# Patient Record
Sex: MALE | Race: BLACK OR AFRICAN AMERICAN | NOT HISPANIC OR LATINO | URBAN - METROPOLITAN AREA
[De-identification: names, ages, dates, MRNs, and addresses within clinical notes are randomized per-mention and may not be internally consistent; named-entity substitution may affect disease eponyms.]

---

## 2017-10-19 PROBLEM — 302109006 GASTROSTOMY PRESENT: Status: ACTIVE | Noted: 2017-10-19

## 2017-10-19 PROBLEM — 14760008 CONSTIPATION: Status: ACTIVE | Noted: 2017-10-19

## 2017-10-19 PROBLEM — 16932000 NAUSEA AND VOMITING: Status: ACTIVE | Noted: 2017-10-19

## 2017-10-19 PROBLEM — 275978004 SCREENING FOR MALIGNANT NEOPLASM OF COLON: Status: ACTIVE | Noted: 2017-10-19

## 2020-08-03 ENCOUNTER — TELEPHONE ENCOUNTER (OUTPATIENT)
Dept: URBAN - METROPOLITAN AREA CLINIC 92 | Facility: CLINIC | Age: 70
End: 2020-08-03

## 2020-08-04 ENCOUNTER — TELEPHONE ENCOUNTER (OUTPATIENT)
Dept: URBAN - METROPOLITAN AREA CLINIC 92 | Facility: CLINIC | Age: 70
End: 2020-08-04

## 2020-08-04 ENCOUNTER — OFFICE VISIT (OUTPATIENT)
Dept: URBAN - METROPOLITAN AREA CLINIC 25 | Facility: CLINIC | Age: 70
End: 2020-08-04
Payer: COMMERCIAL

## 2020-08-04 VITALS
HEIGHT: 67 IN | TEMPERATURE: 98 F | RESPIRATION RATE: 16 BRPM | HEART RATE: 75 BPM | DIASTOLIC BLOOD PRESSURE: 76 MMHG | WEIGHT: 130 LBS | BODY MASS INDEX: 20.4 KG/M2 | SYSTOLIC BLOOD PRESSURE: 199 MMHG

## 2020-08-04 DIAGNOSIS — R13.10 DYSPHAGIA: ICD-10-CM

## 2020-08-04 DIAGNOSIS — K94.23 PEG TUBE MALFUNCTION: ICD-10-CM

## 2020-08-04 PROCEDURE — 3017F COLORECTAL CA SCREEN DOC REV: CPT | Performed by: INTERNAL MEDICINE

## 2020-08-04 PROCEDURE — 99213 OFFICE O/P EST LOW 20 MIN: CPT | Performed by: INTERNAL MEDICINE

## 2020-08-04 PROCEDURE — G8427 DOCREV CUR MEDS BY ELIG CLIN: HCPCS | Performed by: INTERNAL MEDICINE

## 2020-08-04 PROCEDURE — G9903 PT SCRN TBCO ID AS NON USER: HCPCS | Performed by: INTERNAL MEDICINE

## 2020-08-04 PROCEDURE — G8417 CALC BMI ABV UP PARAM F/U: HCPCS | Performed by: INTERNAL MEDICINE

## 2020-08-04 RX ORDER — HYDROCODONE BITARTRATE AND ACETAMINOPHEN 5; 325 MG/1; MG/1
TABLET ORAL
Qty: 0 | Refills: 0 | Status: ACTIVE | COMMUNITY
Start: 1900-01-01

## 2020-08-04 RX ORDER — DOCUSATE SODIUM 100 MG/1
CAPSULE, LIQUID FILLED ORAL
Qty: 0 | Refills: 0 | Status: ACTIVE | COMMUNITY
Start: 1900-01-01

## 2020-08-04 RX ORDER — AMLODIPINE BESYLATE 10 MG/1
TAKE 1 TABLET (10 MG) BY ORAL ROUTE ONCE DAILY TABLET ORAL 1
Qty: 0 | Refills: 0 | Status: ACTIVE | COMMUNITY
Start: 1900-01-01

## 2020-08-04 RX ORDER — PANTOPRAZOLE SODIUM 40 MG/1
TAKE 1 TABLET (40 MG) BY ORAL ROUTE 2 TIMES PER DAY TABLET, DELAYED RELEASE ORAL 2
Qty: 0 | Refills: 0 | Status: ACTIVE | COMMUNITY
Start: 1900-01-01

## 2020-08-04 RX ORDER — INSULIN DETEMIR 100 [IU]/ML
10 UNITS INJECTION, SOLUTION SUBCUTANEOUS
Qty: 1 | Refills: 0 | Status: ACTIVE | COMMUNITY
Start: 1900-01-01

## 2020-08-04 RX ORDER — ONDANSETRON 4 MG/1
DISSOLVE  1-2 TABLETS BY ORAL ROUTE EVERY 8 HOURS AS NEEDED TABLET, ORALLY DISINTEGRATING ORAL
Qty: 30 | Refills: 5 | Status: ACTIVE | COMMUNITY
Start: 2019-07-02

## 2020-08-04 NOTE — PHYSICAL EXAM GASTROINTESTINAL
Abdomen , soft, nontender, nondistended , no guarding or rigidity , no masses palpable , normal bowel sounds.  PEG site intact without erythema/induration/discharge/tenderness Liver and Spleen , no hepatomegaly present , no hepatosplenomegaly , liver nontender , spleen not palpable

## 2020-08-04 NOTE — HPI-TODAY'S VISIT:
Patient last seen 2/2020.  Yesterday at HD his PEG fell out.  He went to the ER at [place].  According to the patient the PEG was d/carlita in the ER.  He has been keeping the site covered today.  He ate today.  Overall he has been eating well.  He denies f/c.  He does tolerate a regular diet.  He denies abdominal pain.  He denies regular N/V.  He denies recurrent CGE.  HE denies early satiety.  He denies LGI symptoms.  He denies LGI bleed or melena.  He was accompanied by his daughter today

## 2020-08-10 ENCOUNTER — TELEPHONE ENCOUNTER (OUTPATIENT)
Dept: URBAN - METROPOLITAN AREA CLINIC 23 | Facility: CLINIC | Age: 70
End: 2020-08-10

## 2021-04-03 ENCOUNTER — OUT OF OFFICE VISIT (OUTPATIENT)
Dept: URBAN - METROPOLITAN AREA MEDICAL CENTER 33 | Facility: MEDICAL CENTER | Age: 71
End: 2021-04-03
Payer: COMMERCIAL

## 2021-04-03 DIAGNOSIS — K92.0 BLOODY EMESIS: ICD-10-CM

## 2021-04-03 DIAGNOSIS — D64.89 ANEMIA DUE TO OTHER CAUSE: ICD-10-CM

## 2021-04-03 DIAGNOSIS — Z87.11 H/O PEPTIC ULCER: ICD-10-CM

## 2021-04-03 DIAGNOSIS — R11.2 ACUTE NAUSEA WITH NONBILIOUS VOMITING: ICD-10-CM

## 2021-04-03 DIAGNOSIS — K31.89 ACQUIRED DEFORMITY OF DUODENUM: ICD-10-CM

## 2021-04-03 DIAGNOSIS — K21.9 ACID REFLUX: ICD-10-CM

## 2021-04-03 PROCEDURE — 43239 EGD BIOPSY SINGLE/MULTIPLE: CPT | Performed by: INTERNAL MEDICINE

## 2021-04-03 PROCEDURE — 99223 1ST HOSP IP/OBS HIGH 75: CPT | Performed by: INTERNAL MEDICINE

## 2021-04-03 PROCEDURE — G8427 DOCREV CUR MEDS BY ELIG CLIN: HCPCS | Performed by: INTERNAL MEDICINE

## 2021-04-05 ENCOUNTER — OUT OF OFFICE VISIT (OUTPATIENT)
Dept: URBAN - METROPOLITAN AREA MEDICAL CENTER 33 | Facility: MEDICAL CENTER | Age: 71
End: 2021-04-05
Payer: COMMERCIAL

## 2021-04-05 DIAGNOSIS — Z87.11 H/O PEPTIC ULCER: ICD-10-CM

## 2021-04-05 DIAGNOSIS — K21.9 ACID REFLUX: ICD-10-CM

## 2021-04-05 DIAGNOSIS — D64.89 ANEMIA DUE TO OTHER CAUSE: ICD-10-CM

## 2021-04-05 DIAGNOSIS — K92.0 BLOODY EMESIS: ICD-10-CM

## 2021-04-05 PROCEDURE — 99232 SBSQ HOSP IP/OBS MODERATE 35: CPT | Performed by: INTERNAL MEDICINE

## 2021-05-13 ENCOUNTER — OFFICE VISIT (OUTPATIENT)
Dept: URBAN - METROPOLITAN AREA CLINIC 84 | Facility: CLINIC | Age: 71
End: 2021-05-13
Payer: COMMERCIAL

## 2021-05-13 DIAGNOSIS — K20.90 ESOPHAGITIS: ICD-10-CM

## 2021-05-13 DIAGNOSIS — R11.2 NON-INTRACTABLE VOMITING WITH NAUSEA, UNSPECIFIED VOMITING TYPE: ICD-10-CM

## 2021-05-13 DIAGNOSIS — R13.10 DYSPHAGIA: ICD-10-CM

## 2021-05-13 PROCEDURE — 99213 OFFICE O/P EST LOW 20 MIN: CPT | Performed by: INTERNAL MEDICINE

## 2021-05-13 RX ORDER — HYDROCODONE BITARTRATE AND ACETAMINOPHEN 5; 325 MG/1; MG/1
TABLET ORAL
Qty: 0 | Refills: 0 | Status: DISCONTINUED | COMMUNITY
Start: 1900-01-01

## 2021-05-13 RX ORDER — PROMETHAZINE HYDROCHLORIDE 25 MG/1
1 TABLET AS NEEDED TABLET ORAL
Qty: 40 | Refills: 2 | OUTPATIENT
Start: 2021-05-13 | End: 2021-08-11

## 2021-05-13 RX ORDER — INSULIN DETEMIR 100 [IU]/ML
10 UNITS INJECTION, SOLUTION SUBCUTANEOUS
Qty: 1 | Refills: 0 | Status: DISCONTINUED | COMMUNITY
Start: 1900-01-01

## 2021-05-13 RX ORDER — PANTOPRAZOLE SODIUM 40 MG/1
TAKE 1 TABLET (40 MG) BY ORAL ROUTE 2 TIMES PER DAY TABLET, DELAYED RELEASE ORAL 2
Qty: 180 TABLET | Refills: 3

## 2021-05-13 RX ORDER — AMLODIPINE BESYLATE 10 MG/1
TAKE 1 TABLET (10 MG) BY ORAL ROUTE ONCE DAILY TABLET ORAL 1
Qty: 0 | Refills: 0 | Status: DISCONTINUED | COMMUNITY
Start: 1900-01-01

## 2021-05-13 RX ORDER — PANTOPRAZOLE SODIUM 40 MG/1
TAKE 1 TABLET (40 MG) BY ORAL ROUTE 2 TIMES PER DAY TABLET, DELAYED RELEASE ORAL 2
Qty: 0 | Refills: 0 | Status: ACTIVE | COMMUNITY
Start: 1900-01-01

## 2021-05-13 RX ORDER — DOCUSATE SODIUM 100 MG/1
CAPSULE, LIQUID FILLED ORAL
Qty: 0 | Refills: 0 | Status: DISCONTINUED | COMMUNITY
Start: 1900-01-01

## 2021-05-13 RX ORDER — FAMOTIDINE 20 MG/1
1 TABLET AT BEDTIME AS NEEDED TABLET, FILM COATED ORAL ONCE A DAY
Status: ACTIVE | COMMUNITY

## 2021-05-13 RX ORDER — INSULIN GLARGINE 100 [IU]/ML
AS DIRECTED INJECTION, SOLUTION SUBCUTANEOUS
Status: ACTIVE | COMMUNITY

## 2021-05-13 RX ORDER — ONDANSETRON 4 MG/1
DISSOLVE  1-2 TABLETS BY ORAL ROUTE EVERY 8 HOURS AS NEEDED TABLET, ORALLY DISINTEGRATING ORAL
Qty: 30 | Refills: 5 | Status: DISCONTINUED | COMMUNITY
Start: 2019-07-02

## 2021-05-13 NOTE — HPI-TODAY'S VISIT:
Patient admitted to [place] on 4/5 for N/V.  EGD had irregular Z-line and retained food.  GES was normal.  This was the first time her was in the hospital in a while.  His family thinks that this may have been triggered by the fact tat he ran out of his PPI therapy. Since d/c he has been feeling ok. He is eating well.  He has not ahd any recurrent N/V. His weight has been stable.  He denie abdominal pain.  He needs a refill on the Protonix. He denies dysphagia.  He denies LGI symptoms.  He denies LGI bleed or melena.  His DM has been under good control. She has no antiemetics at home.  He was on PPI BID.  He has not missed any HD sessions.  His last colonsocopy was in 2018

## 2021-07-23 ENCOUNTER — OUT OF OFFICE VISIT (OUTPATIENT)
Dept: URBAN - METROPOLITAN AREA MEDICAL CENTER 17 | Facility: MEDICAL CENTER | Age: 71
End: 2021-07-23
Payer: COMMERCIAL

## 2021-07-23 DIAGNOSIS — N18.6 ANEMIA DUE TO CHRONIC KIDNEY DISEASE, ON CHRONIC DIALYSIS: ICD-10-CM

## 2021-07-23 DIAGNOSIS — Z99.2 DEPENDENCE ON RENAL DIALYSIS: ICD-10-CM

## 2021-07-23 DIAGNOSIS — K92.0 BLOODY EMESIS: ICD-10-CM

## 2021-07-23 PROCEDURE — 99222 1ST HOSP IP/OBS MODERATE 55: CPT | Performed by: INTERNAL MEDICINE

## 2021-08-10 ENCOUNTER — OUT OF OFFICE VISIT (OUTPATIENT)
Dept: URBAN - METROPOLITAN AREA MEDICAL CENTER 33 | Facility: MEDICAL CENTER | Age: 71
End: 2021-08-10
Payer: COMMERCIAL

## 2021-08-10 DIAGNOSIS — Z87.19 H/O ACUTE PANCREATITIS: ICD-10-CM

## 2021-08-10 DIAGNOSIS — R11.2 ACUTE NAUSEA WITH NONBILIOUS VOMITING: ICD-10-CM

## 2021-08-10 DIAGNOSIS — Z87.11 H/O PEPTIC ULCER: ICD-10-CM

## 2021-08-10 DIAGNOSIS — D64.89 ANEMIA DUE TO OTHER CAUSE: ICD-10-CM

## 2021-08-10 PROCEDURE — 99223 1ST HOSP IP/OBS HIGH 75: CPT | Performed by: INTERNAL MEDICINE

## 2021-08-10 PROCEDURE — G8427 DOCREV CUR MEDS BY ELIG CLIN: HCPCS | Performed by: INTERNAL MEDICINE

## 2021-08-10 PROCEDURE — 99233 SBSQ HOSP IP/OBS HIGH 50: CPT | Performed by: INTERNAL MEDICINE

## 2021-09-09 ENCOUNTER — DASHBOARD ENCOUNTERS (OUTPATIENT)
Age: 71
End: 2021-09-09

## 2021-09-09 ENCOUNTER — WEB ENCOUNTER (OUTPATIENT)
Dept: URBAN - METROPOLITAN AREA CLINIC 84 | Facility: CLINIC | Age: 71
End: 2021-09-09

## 2021-09-09 ENCOUNTER — OFFICE VISIT (OUTPATIENT)
Dept: URBAN - METROPOLITAN AREA CLINIC 84 | Facility: CLINIC | Age: 71
End: 2021-09-09
Payer: COMMERCIAL

## 2021-09-09 VITALS
TEMPERATURE: 97.7 F | BODY MASS INDEX: 23.54 KG/M2 | DIASTOLIC BLOOD PRESSURE: 55 MMHG | WEIGHT: 150 LBS | HEART RATE: 72 BPM | HEIGHT: 67 IN | SYSTOLIC BLOOD PRESSURE: 106 MMHG

## 2021-09-09 DIAGNOSIS — K44.9 HIATAL HERNIA: ICD-10-CM

## 2021-09-09 DIAGNOSIS — R11.2 NON-INTRACTABLE VOMITING WITH NAUSEA, UNSPECIFIED VOMITING TYPE: ICD-10-CM

## 2021-09-09 DIAGNOSIS — K20.90 ESOPHAGITIS: ICD-10-CM

## 2021-09-09 PROCEDURE — 99213 OFFICE O/P EST LOW 20 MIN: CPT | Performed by: INTERNAL MEDICINE

## 2021-09-09 RX ORDER — FAMOTIDINE 20 MG/1
1 TABLET AT BEDTIME AS NEEDED TABLET, FILM COATED ORAL ONCE A DAY
Status: DISCONTINUED | COMMUNITY

## 2021-09-09 RX ORDER — PROMETHAZINE HYDROCHLORIDE 25 MG/1
1 TABLET AS NEEDED TABLET ORAL
Qty: 40 | Refills: 2 | OUTPATIENT
Start: 2021-09-09 | End: 2021-12-07

## 2021-09-09 RX ORDER — INSULIN GLARGINE 100 [IU]/ML
AS DIRECTED INJECTION, SOLUTION SUBCUTANEOUS
Status: ACTIVE | COMMUNITY

## 2021-09-09 RX ORDER — PANTOPRAZOLE SODIUM 40 MG/1
TAKE 1 TABLET (40 MG) BY ORAL ROUTE 2 TIMES PER DAY TABLET, DELAYED RELEASE ORAL 2
Qty: 180 TABLET | Refills: 3 | Status: ACTIVE | COMMUNITY

## 2021-09-09 NOTE — HPI-TODAY'S VISIT:
Patient last seen 5/2021.  In July he was admitted to [facility] for N/V/weakness.  He was admitted about 24-48 hours and then was admitted to [facility].  He was having the N/V.  He denies UGI bleed.  He had EGD when he went to [facility].  Review of the chart snows that he was also at [facility] in July and had EGD by Dr. 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  He was last at [facility] on 8/10.  He has been OK since d/c.  He does have some wekness after HD>  He ahs not had any recurrent N/V since d/c.  He is on Protonix daily.  He is no longer on Carafate.  He ahs not had to use any antiemetics over the past month.  He has nno antiemetics at home.  He denoes abdominal pain.  He denies anroexia or weight loss.  He has some GERD adn when he lays down. He denies LGI symptoms.  He denies LGI bleed.  He is not on AC therapy.  He has not missed HD recently

## 2022-03-06 ENCOUNTER — OUT OF OFFICE VISIT (OUTPATIENT)
Dept: URBAN - METROPOLITAN AREA MEDICAL CENTER 33 | Facility: MEDICAL CENTER | Age: 72
End: 2022-03-06
Payer: COMMERCIAL

## 2022-03-06 DIAGNOSIS — R41.82 ALTERED MENTAL STATE: ICD-10-CM

## 2022-03-06 DIAGNOSIS — K92.0 BLOODY EMESIS: ICD-10-CM

## 2022-03-06 DIAGNOSIS — D64.89 ANEMIA DUE TO OTHER CAUSE: ICD-10-CM

## 2022-03-06 PROCEDURE — G8427 DOCREV CUR MEDS BY ELIG CLIN: HCPCS | Performed by: INTERNAL MEDICINE

## 2022-03-06 PROCEDURE — 99223 1ST HOSP IP/OBS HIGH 75: CPT | Performed by: INTERNAL MEDICINE

## 2022-03-07 ENCOUNTER — OUT OF OFFICE VISIT (OUTPATIENT)
Dept: URBAN - METROPOLITAN AREA MEDICAL CENTER 33 | Facility: MEDICAL CENTER | Age: 72
End: 2022-03-07
Payer: COMMERCIAL

## 2022-03-07 DIAGNOSIS — K92.0 BLOODY EMESIS: ICD-10-CM

## 2022-03-07 DIAGNOSIS — K21.00 ALKALINE REFLUX ESOPHAGITIS: ICD-10-CM

## 2022-03-07 PROCEDURE — 43235 EGD DIAGNOSTIC BRUSH WASH: CPT | Performed by: INTERNAL MEDICINE

## 2022-03-08 ENCOUNTER — OUT OF OFFICE VISIT (OUTPATIENT)
Dept: URBAN - METROPOLITAN AREA MEDICAL CENTER 33 | Facility: MEDICAL CENTER | Age: 72
End: 2022-03-08
Payer: COMMERCIAL

## 2022-03-08 DIAGNOSIS — K72.00 ACUTE AND SUBACUTE HEPATIC FAILURE WITHOUT COMA: ICD-10-CM

## 2022-03-08 DIAGNOSIS — K92.0 BLOODY EMESIS: ICD-10-CM

## 2022-03-08 DIAGNOSIS — D64.89 ANEMIA DUE TO OTHER CAUSE: ICD-10-CM

## 2022-03-08 PROCEDURE — 99233 SBSQ HOSP IP/OBS HIGH 50: CPT | Performed by: INTERNAL MEDICINE

## 2022-03-10 ENCOUNTER — OFFICE VISIT (OUTPATIENT)
Dept: URBAN - METROPOLITAN AREA CLINIC 84 | Facility: CLINIC | Age: 72
End: 2022-03-10

## 2022-04-30 ENCOUNTER — TELEPHONE ENCOUNTER (OUTPATIENT)
Dept: URBAN - METROPOLITAN AREA CLINIC 121 | Facility: CLINIC | Age: 72
End: 2022-04-30

## 2022-04-30 RX ORDER — FLUCONAZOLE 200 MG/1
TAKE 2 TABLET THE FIRST DAY,THEN TAKE 1 TABLET PO FOR 13 DAYS TABLET ORAL
OUTPATIENT
Start: 2016-09-08 | End: 2016-09-22

## 2022-05-01 ENCOUNTER — TELEPHONE ENCOUNTER (OUTPATIENT)
Dept: URBAN - METROPOLITAN AREA CLINIC 121 | Facility: CLINIC | Age: 72
End: 2022-05-01

## 2022-05-01 RX ORDER — INSULIN GLARGINE 100 [IU]/ML
INJECTION, SOLUTION SUBCUTANEOUS
Status: ACTIVE | COMMUNITY
Start: 2017-10-19

## 2022-05-01 RX ORDER — LOSARTAN POTASSIUM 50 MG/1
TABLET, FILM COATED ORAL
Status: ACTIVE | COMMUNITY
Start: 2017-10-19

## 2022-05-01 RX ORDER — PANTOPRAZOLE SODIUM 40 MG/1
TAKE ONE TABLET BY MOUTH ONCE DAILY TABLET, DELAYED RELEASE ORAL
Status: ACTIVE | COMMUNITY
Start: 2017-10-19

## 2022-05-01 RX ORDER — DOXAZOSIN MESYLATE 4 MG/1
TABLET ORAL
Status: ACTIVE | COMMUNITY
Start: 2017-10-19

## 2022-05-01 RX ORDER — LINAGLIPTIN 5 MG/1
TABLET, FILM COATED ORAL
Status: ACTIVE | COMMUNITY
Start: 2017-10-19